# Patient Record
Sex: FEMALE | URBAN - METROPOLITAN AREA
[De-identification: names, ages, dates, MRNs, and addresses within clinical notes are randomized per-mention and may not be internally consistent; named-entity substitution may affect disease eponyms.]

---

## 2022-09-21 ENCOUNTER — TELEPHONE ENCOUNTER (OUTPATIENT)
Dept: URBAN - METROPOLITAN AREA CLINIC 92 | Facility: CLINIC | Age: 27
End: 2022-09-21

## 2022-09-21 ENCOUNTER — OFFICE VISIT (OUTPATIENT)
Dept: URBAN - METROPOLITAN AREA CLINIC 96 | Facility: CLINIC | Age: 27
End: 2022-09-21

## 2022-09-21 ENCOUNTER — WEB ENCOUNTER (OUTPATIENT)
Dept: URBAN - METROPOLITAN AREA CLINIC 96 | Facility: CLINIC | Age: 27
End: 2022-09-21

## 2022-09-23 ENCOUNTER — LAB OUTSIDE AN ENCOUNTER (OUTPATIENT)
Dept: URBAN - METROPOLITAN AREA CLINIC 96 | Facility: CLINIC | Age: 27
End: 2022-09-23

## 2022-09-23 ENCOUNTER — OFFICE VISIT (OUTPATIENT)
Dept: URBAN - METROPOLITAN AREA CLINIC 96 | Facility: CLINIC | Age: 27
End: 2022-09-23
Payer: COMMERCIAL

## 2022-09-23 VITALS
HEIGHT: 62 IN | TEMPERATURE: 97.5 F | DIASTOLIC BLOOD PRESSURE: 80 MMHG | HEART RATE: 82 BPM | SYSTOLIC BLOOD PRESSURE: 139 MMHG | WEIGHT: 155 LBS | BODY MASS INDEX: 28.52 KG/M2

## 2022-09-23 DIAGNOSIS — R63.4 WEIGHT LOSS: ICD-10-CM

## 2022-09-23 DIAGNOSIS — F41.9 ANXIETY: ICD-10-CM

## 2022-09-23 DIAGNOSIS — E80.4 GILBERT SYNDROME: ICD-10-CM

## 2022-09-23 DIAGNOSIS — G43.909 MIGRAINE WITHOUT STATUS MIGRAINOSUS, NOT INTRACTABLE, UNSPECIFIED MIGRAINE TYPE: ICD-10-CM

## 2022-09-23 PROBLEM — 129679001: Status: ACTIVE | Noted: 2022-09-22

## 2022-09-23 PROBLEM — 37796009: Status: ACTIVE | Noted: 2022-09-23

## 2022-09-23 PROCEDURE — 99204 OFFICE O/P NEW MOD 45 MIN: CPT | Performed by: INTERNAL MEDICINE

## 2022-09-23 RX ORDER — RIZATRIPTAN BENZOATE 10 MG/1
1 TABLET TABLET ORAL ONCE A DAY
Status: ACTIVE | COMMUNITY

## 2022-09-23 RX ORDER — NAPROXEN 500 MG/1
1 TABLET WITH FOOD OR MILK AS NEEDED TABLET ORAL
Status: ACTIVE | COMMUNITY

## 2022-09-23 RX ORDER — SERTRALINE HYDROCHLORIDE 50 MG/1
1 TABLET TABLET, FILM COATED ORAL ONCE A DAY
Status: ACTIVE | COMMUNITY

## 2022-09-23 NOTE — HPI-TODAY'S VISIT:
25 yo fem ref fora "enlarged bile duct"and weight loss. A copy of this note will be sent to the ref provider. Pt brought in labs 22- normal cmp, calcium 10.1, cbc normal., bilirubin 1.5. US of abdomen - borderline enlargment of cbd no intrahep dilation, otherwise normal, addenum says normal cbd of 1mm. this appears to have been correctd by the radiologist.  Pt has hx of anxiety. Pt denies depression and pt denies eating disorders. 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 Pt tearful due to having lost uncle, grandfather, dog all . Pt lost 20lbs over 6months. Pt denies gerd or hx fo ulcers. Pt has normal BMs and denies diarrhea or constipation or bleeding.

## 2022-09-24 LAB
BILIRUBIN, DIRECT: 0.4
BILIRUBIN, INDIRECT: 1.7
BILIRUBIN, TOTAL: 2.1

## 2022-10-12 ENCOUNTER — DASHBOARD ENCOUNTERS (OUTPATIENT)
Age: 27
End: 2022-10-12

## 2022-10-14 ENCOUNTER — OFFICE VISIT (OUTPATIENT)
Dept: URBAN - METROPOLITAN AREA CLINIC 96 | Facility: CLINIC | Age: 27
End: 2022-10-14
Payer: COMMERCIAL

## 2022-10-14 VITALS — HEIGHT: 62 IN

## 2022-10-14 DIAGNOSIS — E80.4 GILBERT SYNDROME: ICD-10-CM

## 2022-10-14 DIAGNOSIS — F41.9 ANXIETY: ICD-10-CM

## 2022-10-14 PROBLEM — 48694002: Status: ACTIVE | Noted: 2022-09-23

## 2022-10-14 PROBLEM — 27503000: Status: ACTIVE | Noted: 2022-09-23

## 2022-10-14 PROCEDURE — 99212 OFFICE O/P EST SF 10 MIN: CPT

## 2022-10-14 RX ORDER — NAPROXEN 500 MG/1
1 TABLET WITH FOOD OR MILK AS NEEDED TABLET ORAL
Status: ACTIVE | COMMUNITY

## 2022-10-14 RX ORDER — SERTRALINE HYDROCHLORIDE 50 MG/1
1 TABLET TABLET, FILM COATED ORAL ONCE A DAY
Status: ACTIVE | COMMUNITY

## 2022-10-14 RX ORDER — RIZATRIPTAN BENZOATE 10 MG/1
1 TABLET TABLET ORAL ONCE A DAY
Status: ACTIVE | COMMUNITY

## 2022-10-14 NOTE — HPI-TODAY'S VISIT:
Patient is a 26-year-old female who presents via telehealth to discuss weight loss, anxiety, and abnormal imaging of dilated bile duct previously seen on ultrasound.  CT scan was ordered by Dr. Barnett.   Fractionated lab revealed elevated total bili of 2.1.  A direct bilirubin of 1.4 and indirect bilirubin of 1.7.   Dr. Barnett informed patient this is consistent with Gilbert's.   CT scan of the abdomen completed 9/30/2022 revealed no evidence for bowel obstruction, acute appendicitis or acute diverticulitis.  Nonspecific mild circumferential bladder wall thickening potentially secondary to under distention.  Subcentimeter left renal cyst.  Gallbladder appeared unremarkable and there was no intra or extrahepatic biliary dilation. Denies pain with urination of blood in urine  Patient states she had unintentional weight loss of 25lb in the past 6 months She works full time and goes to school. Additionally ahs had "a lot of death in the family" this year  She feels she has not been super hungry- continues to eat though per patient  She is unclear if she has lost more weight since she does not weigh herself  Patient endorses she believes this is from lifestyle- otherwise feels well  BMs are regular- denies BRBPR or melena Denies N/V Denies fever or chills